# Patient Record
Sex: FEMALE | Race: WHITE | NOT HISPANIC OR LATINO | Employment: OTHER | URBAN - NONMETROPOLITAN AREA
[De-identification: names, ages, dates, MRNs, and addresses within clinical notes are randomized per-mention and may not be internally consistent; named-entity substitution may affect disease eponyms.]

---

## 2021-07-12 ENCOUNTER — HISTORICAL (OUTPATIENT)
Dept: ADMINISTRATIVE | Facility: HOSPITAL | Age: 66
End: 2021-07-12

## 2022-04-11 ENCOUNTER — HISTORICAL (OUTPATIENT)
Dept: ADMINISTRATIVE | Facility: HOSPITAL | Age: 67
End: 2022-04-11

## 2023-06-28 ENCOUNTER — HOSPITAL ENCOUNTER (OUTPATIENT)
Dept: RADIOLOGY | Facility: HOSPITAL | Age: 68
Discharge: HOME | End: 2023-06-28
Attending: INTERNAL MEDICINE
Payer: MEDICARE

## 2023-06-28 DIAGNOSIS — M25.512 LEFT SHOULDER PAIN: ICD-10-CM

## 2023-06-28 DIAGNOSIS — M25.532 LEFT WRIST PAIN: ICD-10-CM

## 2023-06-28 PROCEDURE — 73030 X-RAY EXAM OF SHOULDER: CPT | Mod: TC,LT

## 2023-06-28 PROCEDURE — 73110 X-RAY EXAM OF WRIST: CPT | Mod: TC,LT

## 2025-02-18 ENCOUNTER — HOSPITAL ENCOUNTER (OUTPATIENT)
Dept: RADIOLOGY | Facility: HOSPITAL | Age: 70
Discharge: HOME | End: 2025-02-18
Attending: INTERNAL MEDICINE
Payer: MEDICARE

## 2025-02-18 DIAGNOSIS — M54.50 LOW BACK PAIN: ICD-10-CM

## 2025-02-18 PROCEDURE — 72148 MRI LUMBAR SPINE W/O DYE: CPT | Mod: TC

## 2025-02-20 ENCOUNTER — HOSPITAL ENCOUNTER (OUTPATIENT)
Dept: PREADMISSION TESTING | Facility: HOSPITAL | Age: 70
Discharge: HOME | End: 2025-02-20
Attending: SURGERY
Payer: MEDICARE

## 2025-02-20 DIAGNOSIS — K92.2 ACUTE GASTROINTESTINAL HEMORRHAGE: Primary | ICD-10-CM

## 2025-02-20 NOTE — DISCHARGE INSTRUCTIONS

## 2025-02-21 ENCOUNTER — HOSPITAL ENCOUNTER (OUTPATIENT)
Dept: PREADMISSION TESTING | Facility: HOSPITAL | Age: 70
Discharge: HOME | End: 2025-02-21
Attending: SURGERY
Payer: MEDICARE

## 2025-02-21 VITALS — HEIGHT: 65 IN | BODY MASS INDEX: 26.82 KG/M2 | WEIGHT: 161 LBS

## 2025-02-21 RX ORDER — METOPROLOL TARTRATE 50 MG/1
50 TABLET ORAL NIGHTLY
COMMUNITY

## 2025-02-21 RX ORDER — METOPROLOL TARTRATE 100 MG/1
100 TABLET ORAL EVERY MORNING
COMMUNITY
Start: 2024-11-28

## 2025-02-21 RX ORDER — ROSUVASTATIN CALCIUM 10 MG/1
10 TABLET, COATED ORAL DAILY
COMMUNITY

## 2025-02-21 NOTE — DISCHARGE INSTRUCTIONS

## 2025-02-24 ENCOUNTER — ANESTHESIA EVENT (OUTPATIENT)
Dept: GASTROENTEROLOGY | Facility: HOSPITAL | Age: 70
End: 2025-02-24
Payer: MEDICARE

## 2025-02-24 NOTE — ANESTHESIA PREPROCEDURE EVALUATION
02/24/2025  Sheryl Mendoza is a 69 y.o., female presents for colonoscopy    Anesthesia History    No specialty history recorded    Other Medical History   A-fib High cholesterol     Surgical History    LUNG REMOVAL, PARTIAL LUNG SURGERY   CHOLECYSTECTOMY CRANIOTOMY     Prescription Medications  Within last 14 days from 02/24/25   Last Taken Last Updated   metoprolol tartrate (LOPRESSOR) 100 MG tablet    2/21/2025 02/21/25 1056   metoprolol tartrate (LOPRESSOR) 50 MG tablet    2/20/2025 02/21/25 1056   rosuvastatin (CRESTOR) 10 MG tablet    2/20/2025 02/21/25 1056       Pre-op Assessment    I have reviewed the Patient Summary Reports.     I have reviewed the Nursing Notes. I have reviewed the NPO Status.   I have reviewed the Medications.     Review of Systems  Anesthesia Hx:  No problems with previous Anesthesia             Denies Family Hx of Anesthesia complications.    Denies Personal Hx of Anesthesia complications.                    Hematology/Oncology:  Hematology Normal   Oncology Normal                                   EENT/Dental:  EENT/Dental Normal           Cardiovascular:     Hypertension           hyperlipidemia                           Atrial Fibrillation     Pulmonary:  Pulmonary Normal                       Renal/:  Renal/ Normal                 Hepatic/GI:  Hepatic/GI Normal                    Musculoskeletal:  Musculoskeletal Normal                Neurological:  Neurology Normal                                      Endocrine:  Endocrine Normal            Dermatological:  Skin Normal    Psych:  Psychiatric Normal                    Physical Exam  General: Well nourished, Cooperative, Alert and Oriented    Airway:  Mallampati: II / II  Mouth Opening: Normal  TM Distance: Normal  Tongue: Normal  Neck ROM: Normal ROM        Anesthesia Plan  Type of Anesthesia, risks & benefits  discussed:    Anesthesia Type: MAC  Intra-op Monitoring Plan: Standard ASA Monitors  Post Op Pain Control Plan: multimodal analgesia  Induction:  IV  Informed Consent: Informed consent signed with the Patient and all parties understand the risks and agree with anesthesia plan.  All questions answered. Patient consented to blood products? Yes  ASA Score: 3  Day of Surgery Review of History & Physical: H&P Update referred to the surgeon/provider.I have interviewed and examined the patient. I have reviewed the patient's H&P dated: There are no significant changes.     Ready For Surgery From Anesthesia Perspective.     .

## 2025-02-26 ENCOUNTER — ANESTHESIA (OUTPATIENT)
Dept: GASTROENTEROLOGY | Facility: HOSPITAL | Age: 70
End: 2025-02-26
Payer: MEDICARE

## 2025-03-10 ENCOUNTER — LAB VISIT (OUTPATIENT)
Dept: LAB | Facility: HOSPITAL | Age: 70
End: 2025-03-10
Attending: OBSTETRICS & GYNECOLOGY
Payer: MEDICARE

## 2025-03-10 DIAGNOSIS — N83.291 COMPLEX CYST OF RIGHT OVARY: Primary | ICD-10-CM

## 2025-03-10 DIAGNOSIS — R97.1 ELEVATED CANCER ANTIGEN 125 (CA 125): ICD-10-CM

## 2025-03-10 LAB — CANCER AG125 SERPL-ACNC: 14.9 UNIT/ML (ref 0–35)

## 2025-03-10 PROCEDURE — 86304 IMMUNOASSAY TUMOR CA 125: CPT

## 2025-03-10 PROCEDURE — 36415 COLL VENOUS BLD VENIPUNCTURE: CPT

## 2025-03-11 ENCOUNTER — HOSPITAL ENCOUNTER (OUTPATIENT)
Dept: GASTROENTEROLOGY | Facility: HOSPITAL | Age: 70
Discharge: HOME | End: 2025-03-11
Attending: INTERNAL MEDICINE
Payer: MEDICARE

## 2025-03-11 VITALS
HEART RATE: 80 BPM | OXYGEN SATURATION: 100 % | TEMPERATURE: 97 F | BODY MASS INDEX: 26.78 KG/M2 | SYSTOLIC BLOOD PRESSURE: 117 MMHG | RESPIRATION RATE: 20 BRPM | WEIGHT: 160.94 LBS | DIASTOLIC BLOOD PRESSURE: 76 MMHG

## 2025-03-11 DIAGNOSIS — K92.2 GASTRIC BLEEDING: ICD-10-CM

## 2025-03-11 DIAGNOSIS — K92.2 ACUTE GASTROINTESTINAL HEMORRHAGE: ICD-10-CM

## 2025-03-11 PROCEDURE — 63600175 PHARM REV CODE 636 W HCPCS: Performed by: NURSE ANESTHETIST, CERTIFIED REGISTERED

## 2025-03-11 PROCEDURE — D9220A PRA ANESTHESIA: Mod: ,,, | Performed by: NURSE ANESTHETIST, CERTIFIED REGISTERED

## 2025-03-11 PROCEDURE — 37000009 HC ANESTHESIA EA ADD 15 MINS

## 2025-03-11 PROCEDURE — 27201423 OPTIME MED/SURG SUP & DEVICES STERILE SUPPLY

## 2025-03-11 PROCEDURE — 45380 COLONOSCOPY AND BIOPSY: CPT | Performed by: FAMILY MEDICINE

## 2025-03-11 PROCEDURE — 37000008 HC ANESTHESIA 1ST 15 MINUTES

## 2025-03-11 RX ORDER — LIDOCAINE HYDROCHLORIDE 20 MG/ML
INJECTION INTRAVENOUS
Status: DISCONTINUED | OUTPATIENT
Start: 2025-03-11 | End: 2025-03-11

## 2025-03-11 RX ORDER — PROPOFOL 10 MG/ML
VIAL (ML) INTRAVENOUS
Status: DISCONTINUED | OUTPATIENT
Start: 2025-03-11 | End: 2025-03-11

## 2025-03-11 RX ADMIN — PROPOFOL 50 MG: 10 INJECTION, EMULSION INTRAVENOUS at 07:03

## 2025-03-11 RX ADMIN — PROPOFOL 30 MG: 10 INJECTION, EMULSION INTRAVENOUS at 07:03

## 2025-03-11 RX ADMIN — LIDOCAINE HYDROCHLORIDE 50 MG: 20 INJECTION, SOLUTION INTRAVENOUS at 06:03

## 2025-03-11 RX ADMIN — PROPOFOL 70 MG: 10 INJECTION, EMULSION INTRAVENOUS at 06:03

## 2025-03-11 NOTE — OR NURSING
Patient is back to her room in stable condition, No difficulty breathing or swallowing, No s/s of distress noted, SR up x 2 with call bell in reach, Family member at bedside, Denies pain, Negative flatus

## 2025-03-11 NOTE — DISCHARGE SUMMARY
[facility]  [facility]    Colonoscopy      OUTCOME: Patient tolerated treatment/procedure well without complication and is now ready for discharge.    DISPOSITION: Home or Self Care    FINAL DIAGNOSIS:  <principal problem not specified>    FOLLOWUP: In clinic    DISCHARGE INSTRUCTIONS:  No discharge procedures on file.     TIME SPENT ON DISCHARGE: 15 minutes

## 2025-03-11 NOTE — ANESTHESIA POSTPROCEDURE EVALUATION
Anesthesia Post Evaluation    Patient: Sheryl Mendoza    Procedure(s) Performed: * No procedures listed *    Final Anesthesia Type: MAC      Patient location during evaluation: OPS  Patient participation: Yes- Able to Participate  Level of consciousness: awake and alert, awake and oriented  Post-procedure vital signs: reviewed and stable  Pain management: adequate  Airway patency: patent    PONV status at discharge: No PONV  Anesthetic complications: no      Cardiovascular status: blood pressure returned to baseline  Respiratory status: unassisted, room air and spontaneous ventilation  Hydration status: euvolemic  Follow-up not needed.              Vitals Value Taken Time   /70 03/11/25 05:33   Temp 36.4 °C (97.6 °F) 03/11/25 05:33   Pulse 76 03/11/25 05:33   Resp 20 03/11/25 05:33   SpO2 100 % 03/11/25 05:33         No case tracking events are documented in the log.      Pain/Urszula Score: No data recorded

## 2025-03-11 NOTE — OR NURSING
Positive flatus, Patient is drinking Sprite and tolerating it well, No s/s of distress noted, SR up x 2 with call bell in reach, Family member at bedside, Stable condition